# Patient Record
Sex: MALE | Race: BLACK OR AFRICAN AMERICAN
[De-identification: names, ages, dates, MRNs, and addresses within clinical notes are randomized per-mention and may not be internally consistent; named-entity substitution may affect disease eponyms.]

---

## 2018-04-20 ENCOUNTER — HOSPITAL ENCOUNTER (EMERGENCY)
Dept: HOSPITAL 5 - ED | Age: 53
Discharge: HOME | End: 2018-04-20
Payer: COMMERCIAL

## 2018-04-20 VITALS — SYSTOLIC BLOOD PRESSURE: 120 MMHG | DIASTOLIC BLOOD PRESSURE: 72 MMHG
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
## 2018-04-20 DIAGNOSIS — S02.2XXA: Primary | ICD-10-CM

## 2018-04-20 DIAGNOSIS — S22.32XA: ICD-10-CM
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
## 2018-04-20 DIAGNOSIS — F17.200: ICD-10-CM
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
## 2018-04-20 DIAGNOSIS — S00.03XA: ICD-10-CM

## 2018-04-20 DIAGNOSIS — Z98.61: ICD-10-CM

## 2018-04-20 PROCEDURE — 70486 CT MAXILLOFACIAL W/O DYE: CPT

## 2018-04-20 PROCEDURE — 71250 CT THORAX DX C-: CPT

## 2018-04-20 PROCEDURE — 72125 CT NECK SPINE W/O DYE: CPT

## 2018-04-20 PROCEDURE — 90715 TDAP VACCINE 7 YRS/> IM: CPT

## 2018-04-20 PROCEDURE — 70450 CT HEAD/BRAIN W/O DYE: CPT

## 2018-04-20 PROCEDURE — 71046 X-RAY EXAM CHEST 2 VIEWS: CPT

## 2018-04-20 PROCEDURE — 90471 IMMUNIZATION ADMIN: CPT
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
## 2018-09-19 ENCOUNTER — HOSPITAL ENCOUNTER (EMERGENCY)
Dept: HOSPITAL 5 - ED | Age: 53
Discharge: HOME | End: 2018-09-19
Payer: COMMERCIAL

## 2018-09-19 VITALS — DIASTOLIC BLOOD PRESSURE: 85 MMHG | SYSTOLIC BLOOD PRESSURE: 145 MMHG

## 2018-09-19 DIAGNOSIS — G51.0: Primary | ICD-10-CM

## 2018-09-19 DIAGNOSIS — Z95.818: ICD-10-CM

## 2018-09-19 DIAGNOSIS — F17.200: ICD-10-CM

## 2018-09-19 DIAGNOSIS — I25.2: ICD-10-CM

## 2018-09-19 LAB
APTT BLD: 27.4 SEC. (ref 24.2–36.6)
BASOPHILS # (AUTO): 0.1 K/MM3 (ref 0–0.1)
BASOPHILS NFR BLD AUTO: 1.2 % (ref 0–1.8)
BUN SERPL-MCNC: 11 MG/DL (ref 9–20)
BUN/CREAT SERPL: 18 %
CALCIUM SERPL-MCNC: 9.7 MG/DL (ref 8.4–10.2)
EOSINOPHIL # BLD AUTO: 0.3 K/MM3 (ref 0–0.4)
EOSINOPHIL NFR BLD AUTO: 2.8 % (ref 0–4.3)
HCT VFR BLD CALC: 47.9 % (ref 35.5–45.6)
HEMOLYSIS INDEX: 6
HGB BLD-MCNC: 16.2 GM/DL (ref 11.8–15.2)
INR PPP: 0.87 (ref 0.87–1.13)
LYMPHOCYTES # BLD AUTO: 2.5 K/MM3 (ref 1.2–5.4)
LYMPHOCYTES NFR BLD AUTO: 20.5 % (ref 13.4–35)
MCH RBC QN AUTO: 30 PG (ref 28–32)
MCHC RBC AUTO-ENTMCNC: 34 % (ref 32–34)
MCV RBC AUTO: 89 FL (ref 84–94)
MONOCYTES # (AUTO): 1 K/MM3 (ref 0–0.8)
MONOCYTES % (AUTO): 7.9 % (ref 0–7.3)
PLATELET # BLD: 245 K/MM3 (ref 140–440)
RBC # BLD AUTO: 5.38 M/MM3 (ref 3.65–5.03)

## 2018-09-19 PROCEDURE — 36415 COLL VENOUS BLD VENIPUNCTURE: CPT

## 2018-09-19 PROCEDURE — 70450 CT HEAD/BRAIN W/O DYE: CPT

## 2018-09-19 PROCEDURE — 93005 ELECTROCARDIOGRAM TRACING: CPT

## 2018-09-19 PROCEDURE — 85025 COMPLETE CBC W/AUTO DIFF WBC: CPT

## 2018-09-19 PROCEDURE — 80048 BASIC METABOLIC PNL TOTAL CA: CPT

## 2018-09-19 PROCEDURE — 93010 ELECTROCARDIOGRAM REPORT: CPT

## 2018-09-19 PROCEDURE — 84484 ASSAY OF TROPONIN QUANT: CPT

## 2018-09-19 PROCEDURE — 85670 THROMBIN TIME PLASMA: CPT

## 2018-09-19 PROCEDURE — 85730 THROMBOPLASTIN TIME PARTIAL: CPT

## 2018-09-19 PROCEDURE — 85610 PROTHROMBIN TIME: CPT

## 2018-09-19 PROCEDURE — 82962 GLUCOSE BLOOD TEST: CPT

## 2018-09-19 NOTE — EMERGENCY DEPARTMENT REPORT
ED Neuro Deficit HPI





- General


Chief Complaint: Neuro Symptoms/Deficit


Stated Complaint: POSS STROKE/PAIN


Time Seen by Provider: 09/19/18 10:19


Source: patient


Mode of arrival: Ambulatory


Limitations: No Limitations





- History of Present Illness


Initial Comments: 


She noted that last night he had difficulty closing his right eye and moving 

his right face.  He denied any difficulty in swallowing, shrugging his shoulders

, double vision, facial numbness, difficulty walking or difficulty with speech.

  He has had no arm or leg weakness or numbness.  The symptoms did not progress 

since last night.  He was concerned that he might have had a stroke so he 

presented to the emergency department for evaluation this morning.





-: Gradual, Last night


Location: right face


Presenting Symptoms: Present: Facial Droop/Numbness (no numbness)


History of same: No


Place: home


Severity: moderate


Quality: weak


Improves With: none


Worsens With: none


On Anticoagulants: No


Context: gradual onset


Associated Symptoms: denies other symptoms





- Related Data


Home Medications: 


 Home Medications











 Medication  Instructions  Recorded  Confirmed  Last Taken


 


Benzonatate 200 mg PO TID PRN 09/19/18 09/19/18 Unknown


 


Carvedilol [Coreg] 6.25 mg PO BID 09/19/18 09/19/18 Unknown


 


Cefdinir 300 mg PO BID 09/19/18 09/19/18 Unknown


 


Gemfibrozil [Lopid] 600 mg PO BID 09/19/18 09/19/18 Unknown


 


Loratadine [Claritin] 10 mg PO DAILY 09/19/18 09/19/18 Unknown


 


Omeprazole 40 mg PO DAILY 09/19/18 09/19/18 Unknown


 


metFORMIN [Glucophage] 500 mg PO BID 09/19/18 09/19/18 Unknown








 Previous Rx's











 Medication  Instructions  Recorded  Last Taken  Type


 


Valacyclovir HCl [Valtrex] 1,000 mg PO BID #20 tab 09/19/18 Unknown Rx











Allergies/Adverse Reactions: 


 Allergies











Allergy/AdvReac Type Severity Reaction Status Date / Time


 


No Known Allergies Allergy   Verified 01/02/15 12:36














ED Review of Systems


ROS: 


Stated complaint: POSS STROKE/PAIN


Other details as noted in HPI





Constitutional: denies: chills, fever


Eyes: denies: eye pain, eye discharge, vision change


ENT: denies: ear pain, throat pain


Respiratory: denies: cough, shortness of breath, wheezing


Cardiovascular: denies: chest pain, palpitations


Endocrine: no symptoms reported


Gastrointestinal: denies: abdominal pain, nausea, diarrhea


Genitourinary: denies: urgency, dysuria


Musculoskeletal: denies: back pain, joint swelling, arthralgia


Skin: denies: rash, lesions


Neurological: as per HPI, other (facial paresis only).  denies: headache, 

weakness, numbness, paresthesias, abnormal gait


Psychiatric: denies: anxiety, depression


Hematological/Lymphatic: denies: easy bleeding, easy bruising





ED Past Medical Hx





- Past Medical History


Hx Heart Attack/AMI: Yes


Hx Congestive Heart Failure: No


Hx Diabetes: No


Hx Asthma: No


Hx COPD: No





- Surgical History


Additional Surgical History: CARDIAC STENT





- Social History


Smoking Status: Current Every Day Smoker


Substance Use Type: None





- Medications


Home Medications: 


 Home Medications











 Medication  Instructions  Recorded  Confirmed  Last Taken  Type


 


Benzonatate 200 mg PO TID PRN 09/19/18 09/19/18 Unknown History


 


Carvedilol [Coreg] 6.25 mg PO BID 09/19/18 09/19/18 Unknown History


 


Cefdinir 300 mg PO BID 09/19/18 09/19/18 Unknown History


 


Gemfibrozil [Lopid] 600 mg PO BID 09/19/18 09/19/18 Unknown History


 


Loratadine [Claritin] 10 mg PO DAILY 09/19/18 09/19/18 Unknown History


 


Omeprazole 40 mg PO DAILY 09/19/18 09/19/18 Unknown History


 


Valacyclovir HCl [Valtrex] 1,000 mg PO BID #20 tab 09/19/18  Unknown Rx


 


metFORMIN [Glucophage] 500 mg PO BID 09/19/18 09/19/18 Unknown History














ED Neuro Physical Exam





- General


Limitations: No Limitations


General appearance: alert, in no apparent distress


Suspected Stroke: No





- Head


Head exam: Present: atraumatic, normocephalic





- Eye


Eye exam: Present: normal appearance, PERRL, EOMI.  Absent: scleral icterus





- ENT


ENT exam: Present: mucous membranes moist, other (facial asymmetry which doesn'

t involve the forehead mildly and the orbicularis oculi definitely as well as 

the lower facial distribution)





- Neck


Neck exam: Present: normal inspection





- Respiratory


Respiratory exam: Present: normal lung sounds bilaterally.  Absent: respiratory 

distress





- Cardiovascular


Cardiovascular Exam: Present: regular rate, normal rhythm.  Absent: systolic 

murmur, diastolic murmur, rubs, gallop





- GI/Abdominal


GI/Abdominal exam: Present: soft, normal bowel sounds.  Absent: distended, 

tenderness, guarding, rebound, rigid





- Rectal


Rectal exam: Present: deferred





- Extremities Exam


Extremities exam: Present: normal inspection





- Back Exam


Back exam: Present: normal inspection





- Neurological Exam


Neurological exam: Present: alert, oriented X3, motor sensory deficit.  Absent: 

CN II-XII intact





- NIHSS


Assessment Interval: Baseline


1a. Level of Consciousness: alert/keenly responsive


1b. LOC Questions: answers both correctly


1c. LOC Commands: performs tasks correctly


2. Best Gaze: normal


3. Visual: no visual loss


4. Facial Palsy: minor paralysis


5b. Motor Arm Right: no drift


5a. Motor Arm Left: no drift


6a. Motor Leg Left: no drift


6b. Motor Leg Right: no drift


7. Limb Ataxia: absent


8. Sensory: normal


9. Best Language: no aphasia


10. Dysarthria: normal


11. Extinction/Inattention: no abnormality


Total Score: 1


Stroke Severity: Minor Stroke





- Psychiatric


Psychiatric exam: Present: normal affect, normal mood





- Skin


Skin exam: Present: warm, dry, intact, normal color.  Absent: rash





ED Course


 Vital Signs











  09/19/18 09/19/18 09/19/18





  09:57 10:17 10:30


 


Temperature 98.6 F  


 


Pulse Rate 66 63 66


 


Respiratory 16 11 L 16





Rate   


 


Blood Pressure 157/96  140/89


 


Blood Pressure   





[Left]   


 


O2 Sat by Pulse 99  98





Oximetry   














  09/19/18 09/19/18 09/19/18





  10:32 10:45 11:00


 


Temperature 98.3 F  


 


Pulse Rate 65 62 57 L


 


Respiratory 16 16 13





Rate   


 


Blood Pressure  134/82 138/80


 


Blood Pressure 127/101  





[Left]   


 


O2 Sat by Pulse 99 97 97





Oximetry   














  09/19/18 09/19/18 09/19/18





  11:15 11:30 11:45


 


Temperature   


 


Pulse Rate 60 60 73


 


Respiratory 14 14 10 L





Rate   


 


Blood Pressure 123/76 130/78 135/90


 


Blood Pressure   





[Left]   


 


O2 Sat by Pulse 97 96 97





Oximetry   














- Reevaluation(s)


Reevaluation #1: 


The patient's presentation is consistent with Bell's palsy.  He will be given 1 

dose of Decadron while he is here.  He will be referred to neurology.  He will 

be continued on Valtrex.  On reexamination he had no neurological change or any 

progression.  He is appropriate for outpatient follow-up.


09/19/18 13:19








- Lab Data


Result diagrams: 


 09/19/18 10:09





 09/19/18 10:09


 Lab Results











  09/19/18 09/19/18 09/19/18 Range/Units





  09:54 10:09 10:09 


 


WBC   12.0 H   (4.5-11.0)  K/mm3


 


RBC   5.38 H   (3.65-5.03)  M/mm3


 


Hgb   16.2 H   (11.8-15.2)  gm/dl


 


Hct   47.9 H   (35.5-45.6)  %


 


MCV   89   (84-94)  fl


 


MCH   30   (28-32)  pg


 


MCHC   34   (32-34)  %


 


RDW   12.7 L   (13.2-15.2)  %


 


Plt Count   245   (140-440)  K/mm3


 


Lymph % (Auto)   20.5   (13.4-35.0)  %


 


Mono % (Auto)   7.9 H   (0.0-7.3)  %


 


Eos % (Auto)   2.8   (0.0-4.3)  %


 


Baso % (Auto)   1.2   (0.0-1.8)  %


 


Lymph #   2.5   (1.2-5.4)  K/mm3


 


Mono #   1.0 H   (0.0-0.8)  K/mm3


 


Eos #   0.3   (0.0-0.4)  K/mm3


 


Baso #   0.1   (0.0-0.1)  K/mm3


 


Seg Neutrophils %   67.6   (40.0-70.0)  %


 


Seg Neutrophils #   8.1 H   (1.8-7.7)  K/mm3


 


PT    12.2  (12.2-14.9)  Sec.


 


INR    0.87  (0.87-1.13)  


 


APTT    27.4  (24.2-36.6)  Sec.


 


Thrombin Time     (15.1-19.6)  Sec.


 


Sodium     (137-145)  mmol/L


 


Potassium     (3.6-5.0)  mmol/L


 


Chloride     ()  mmol/L


 


Carbon Dioxide     (22-30)  mmol/L


 


Anion Gap     mmol/L


 


BUN     (9-20)  mg/dL


 


Creatinine     (0.8-1.5)  mg/dL


 


Estimated GFR     ml/min


 


BUN/Creatinine Ratio     %


 


Glucose     ()  mg/dL


 


POC Glucose  207 H    ()  


 


Calcium     (8.4-10.2)  mg/dL


 


Troponin T     (0.00-0.029)  ng/mL














  09/19/18 09/19/18 09/19/18 Range/Units





  10:09 10:09 10:31 


 


WBC     (4.5-11.0)  K/mm3


 


RBC     (3.65-5.03)  M/mm3


 


Hgb     (11.8-15.2)  gm/dl


 


Hct     (35.5-45.6)  %


 


MCV     (84-94)  fl


 


MCH     (28-32)  pg


 


MCHC     (32-34)  %


 


RDW     (13.2-15.2)  %


 


Plt Count     (140-440)  K/mm3


 


Lymph % (Auto)     (13.4-35.0)  %


 


Mono % (Auto)     (0.0-7.3)  %


 


Eos % (Auto)     (0.0-4.3)  %


 


Baso % (Auto)     (0.0-1.8)  %


 


Lymph #     (1.2-5.4)  K/mm3


 


Mono #     (0.0-0.8)  K/mm3


 


Eos #     (0.0-0.4)  K/mm3


 


Baso #     (0.0-0.1)  K/mm3


 


Seg Neutrophils %     (40.0-70.0)  %


 


Seg Neutrophils #     (1.8-7.7)  K/mm3


 


PT     (12.2-14.9)  Sec.


 


INR     (0.87-1.13)  


 


APTT     (24.2-36.6)  Sec.


 


Thrombin Time   15.6   (15.1-19.6)  Sec.


 


Sodium  138    (137-145)  mmol/L


 


Potassium  4.4    (3.6-5.0)  mmol/L


 


Chloride  100.9    ()  mmol/L


 


Carbon Dioxide  22    (22-30)  mmol/L


 


Anion Gap  20    mmol/L


 


BUN  11    (9-20)  mg/dL


 


Creatinine  0.6 L    (0.8-1.5)  mg/dL


 


Estimated GFR  > 60    ml/min


 


BUN/Creatinine Ratio  18    %


 


Glucose  239 H    ()  mg/dL


 


POC Glucose    203 H  ()  


 


Calcium  9.7    (8.4-10.2)  mg/dL


 


Troponin T  < 0.010    (0.00-0.029)  ng/mL














- EKG Data


-: EKG Interpreted by Me


EKG shows normal: sinus rhythm


Rate: normal


Interpretation: other (patient has elevated J point in V2 which is probably 

secondary to old dead zone in the anterior septal/LVH)


His previous EKG was reviewed and there is no interval change.


09/19/18 13:15





09/19/18 13:15








- Radiology Data


Radiology results: report reviewed


interpreted by me: 


IMPRESSION: 


Cranial CT scan within normal limits. 








Critical care attestation.: 


If time is entered above; I have spent that time in minutes in the direct care 

of this critically ill patient, excluding procedure time.








ED Disposition


Clinical Impression: 


 Bell's palsy





Disposition: DC-01 TO HOME OR SELFCARE


Is pt being admited?: No


Does the pt Need Aspirin: No


Condition: Stable


Instructions:  Bell Palsy (ED)


Additional Instructions: 


Rx as directed.  Follow up with neurologist.  I would recommend that you 

certainly continue your aspirin.  This does not appear to be a stroke but is a 

Bell's palsy.  Return any acute change or other type of neurological symptoms 

should they occur.  I have also written an eye lubricating ointment for use at 

least at night when you go to bed.  This type of condition may dry her right 

eye and cause redness and inflammation without use of a lubricating eye 

ointment.


Prescriptions: 


Valacyclovir HCl [Valtrex] 1,000 mg PO BID #20 tab


Referrals: 


PRIMARY CARE,MD [Primary Care Provider] - 3-5 Days


REBEL RAMÍREZ MD [Staff Physician] - 3-5 Days


Time of Disposition: 13:23

## 2018-09-19 NOTE — CAT SCAN REPORT
CT HEAD WITHOUT CONTRAST:



HISTORY:  Neurological deficit.



TECHNIQUE:  Sequential 2.5mm CT images.



COMPARISON: 4/20/18.



FINDINGS:



Cerebral Parenchyma: Within normal limits.



Cerebellum:  Within normal limits.



Brainstem:  Within normal limits.



Ventricles: Normal.



Sella:  Normal.



Extra-axial spaces:  Normal.



Basal Cisterns:  Normal.



Intracranial Hemorrhage:  None.



Midline Shift:  None.



Calvarium: Normal.



Sinuses: Normal.



Mastoid Air Cells:  Normal.



Visualized Orbits:  Normal.







IMPRESSION:

Cranial CT scan within normal limits.



These findings were discussed with Dr. Fischer in the emergency 

department at 1013 hrs.